# Patient Record
Sex: FEMALE | Race: WHITE
[De-identification: names, ages, dates, MRNs, and addresses within clinical notes are randomized per-mention and may not be internally consistent; named-entity substitution may affect disease eponyms.]

---

## 2019-04-16 ENCOUNTER — HOSPITAL ENCOUNTER (EMERGENCY)
Dept: HOSPITAL 97 - ER | Age: 39
Discharge: HOME | End: 2019-04-16
Payer: SELF-PAY

## 2019-04-16 DIAGNOSIS — M54.9: Primary | ICD-10-CM
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
## 2019-04-16 DIAGNOSIS — I10: ICD-10-CM

## 2019-04-16 DIAGNOSIS — M54.2: ICD-10-CM

## 2019-04-16 PROCEDURE — 81025 URINE PREGNANCY TEST: CPT

## 2019-04-16 PROCEDURE — 74177 CT ABD & PELVIS W/CONTRAST: CPT

## 2019-04-16 PROCEDURE — 71260 CT THORAX DX C+: CPT

## 2019-04-16 PROCEDURE — 81003 URINALYSIS AUTO W/O SCOPE: CPT

## 2019-04-16 PROCEDURE — 70450 CT HEAD/BRAIN W/O DYE: CPT

## 2019-04-16 PROCEDURE — 72125 CT NECK SPINE W/O DYE: CPT

## 2019-04-16 PROCEDURE — 36415 COLL VENOUS BLD VENIPUNCTURE: CPT

## 2019-04-16 PROCEDURE — 99285 EMERGENCY DEPT VISIT HI MDM: CPT
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
## 2022-12-05 ENCOUNTER — HOSPITAL ENCOUNTER (EMERGENCY)
Dept: HOSPITAL 97 - ER | Age: 42
Discharge: HOME | End: 2022-12-05
Payer: COMMERCIAL

## 2022-12-05 VITALS — OXYGEN SATURATION: 97 % | SYSTOLIC BLOOD PRESSURE: 134 MMHG | DIASTOLIC BLOOD PRESSURE: 88 MMHG

## 2022-12-05 VITALS — TEMPERATURE: 99.2 F

## 2022-12-05 DIAGNOSIS — I10: ICD-10-CM

## 2022-12-05 DIAGNOSIS — Z20.822: ICD-10-CM

## 2022-12-05 DIAGNOSIS — J06.9: Primary | ICD-10-CM

## 2022-12-05 LAB — SARS-COV-2 RNA RESP QL NAA+PROBE: NEGATIVE

## 2022-12-05 PROCEDURE — 0240U: CPT

## 2022-12-05 PROCEDURE — 99284 EMERGENCY DEPT VISIT MOD MDM: CPT

## 2022-12-05 PROCEDURE — 71045 X-RAY EXAM CHEST 1 VIEW: CPT

## 2022-12-05 PROCEDURE — 96372 THER/PROPH/DIAG INJ SC/IM: CPT

## 2022-12-05 NOTE — EDPHYS
Physician Documentation                                                                           

 [facility]                                                                 

Name: Susan Euceda                                                                             

Age: 42 yrs                                                                                       

Sex: Female                                                                                       

: 1980                                                                                   

MRN: [id number]                                                                                   

Arrival Date: 2022                                                                          

Time: 13:31                                                                                       

Account#: [id number]                                                                            

Bed 12                                                                                            

Private MD:                                                                                       

ED Physician Erasto Billy                                                                       

HPI:                                                                                              

                                                                                             

15:53 This 42 yrs old  Female presents to ER via Ambulatory with complaints of Cough, kb  

      Fever, Congestion, Chest Pain, Back Pain.                                                   

15:53 The patient or guardian reports cough, that is intermittent, described as moderate, flu kb  

      symptoms, low-grade fever, myalgias. Onset: The symptoms/episode began/occurred 5           

      day(s) ago. Severity of symptoms: At their worst the symptoms were moderate, in the         

      emergency department the symptoms are unchanged. Modifying factors: The symptoms are        

      alleviated by nothing, the symptoms are aggravated by nothing. Associated signs and         

      symptoms: Pertinent positives: fever, rhinorrhea, Pertinent negatives: chest pain,          

      diarrhea, ear ache, nausea, vomiting. The patient has not experienced similar symptoms      

      in the past. The patient has not recently seen a physician. Pt reports cough,               

      congestion, fever since Thursday.                                                           

                                                                                                  

OB/GYN:                                                                                           

14:02 LMP 11/15/2022                                                                          jl7 

                                                                                                  

Historical:                                                                                       

- Allergies:                                                                                      

14:02 No Known Allergies;                                                                     jl7 

- Home Meds:                                                                                      

14:02 lisinopril 12.5 mg Oral tab 1 tab twice a day [Active];                                 jl7 

- PMHx:                                                                                           

14:02 Hypertension;                                                                           jl7 

- PSHx:                                                                                           

14:02 Ligation of fallopian tube;                                                             jl7 

                                                                                                  

- Immunization history:: Client reports receiving the 2nd dose of the Covid vaccine.              

- Social history:: Smoking status: Patient denies any tobacco usage or history of.                

                                                                                                  

                                                                                                  

ROS:                                                                                              

15:51 Cardiovascular: Negative for chest pain, palpitations, and edema.                       kb  

15:51 Constitutional: Positive for body aches, chills, fatigue, fever, malaise.                   

15:51 ENT: Positive for rhinorrhea, sinus congestion.                                             

15:51 Respiratory: Positive for cough.                                                            

15:51 All other systems are negative.                                                             

                                                                                                  

Exam:                                                                                             

15:51 Constitutional:  This is a well developed, well nourished patient who is awake, alert,  kb  

      and in no acute distress. Head/Face:  Normocephalic, atraumatic. ENT:  Moist Mucous         

      membranes Cardiovascular:  Regular rate and rhythm with a normal S1 and S2.  No             

      gallops, murmurs, or rubs.  No pulse deficits. Respiratory:  Respirations even and          

      unlabored. No increased work of breathing. Talking in full sentences Abdomen/GI:  Soft,     

      non-tender. No distention Skin:  Warm, dry with normal turgor.  Normal color. MS/           

      Extremity:  Pulses equal, no cyanosis.  Neurovascular intact.  Full, normal range of        

      motion. Neuro:  Awake and alert, GCS 15, oriented to person, place, time, and               

      situation. Moves all extremities. Normal gait. Psych:  Awake, alert, with orientation       

      to person, place and time.  Behavior, mood, and affect are within normal limits.            

                                                                                                  

Vital Signs:                                                                                      

14:01  / 94; Pulse 105; Resp 17; Temp 99.2; Pulse Ox 96% ; Weight 93.89 kg; Height 5    jl7 

      ft. 1 in. (154.94 cm); Pain 8/10;                                                           

16:02  / 88; Pulse 101; Pulse Ox 97% on R/A;                                            ko1 

14:01 Body Mass Index 39.11 (93.89 kg, 154.94 cm)                                             jl7 

                                                                                                  

MDM:                                                                                              

15:02 Patient medically screened.                                                             kb  

15:51 Data reviewed: vital signs, nurses notes. Data interpreted: Pulse oximetry: on room air kb  

      is 96 %. Interpretation: normal. Counseling: I had a detailed discussion with the           

      patient and/or guardian regarding: the historical points, exam findings, and any            

      diagnostic results supporting the discharge/admit diagnosis, lab results, radiology         

      results, the need for outpatient follow up, a family practitioner, to return to the         

      emergency department if symptoms worsen or persist or if there are any questions or         

      concerns that arise at home.                                                                

                                                                                                  

                                                                                             

14:01 Order name: COVID-19/FLU A+B; Complete Time: 15:00                                      jl7 

                                                                                             

14:01 Order name: XRAY Chest (1 view); Complete Time: 15:51                                   jl7 

                                                                                                  

Administered Medications:                                                                         

15:56 Drug: SOLU-Medrol (methylPREDNISolone sodium succinate) 125 mg Route: IM; Site: right   ko1 

      gluteus;                                                                                    

16:23 Follow up: Response: No adverse reaction                                                ko1 

                                                                                                  

                                                                                                  

Disposition:                                                                                      

17:14 Co-signature as Attending Physician, Erasto Billy MD I agree with the assessment and   kdr 

      plan of care.                                                                               

                                                                                                  

Disposition Summary:                                                                              

22 15:54                                                                                    

Discharge Ordered                                                                                 

      Location: Home                                                                          kb  

      Condition: Stable                                                                       kb  

      Diagnosis                                                                                   

        - Acute upper respiratory infection, unspecified                                      kb  

      Followup:                                                                               kb  

        - With: Emergency Department                                                               

        - When: As needed                                                                          

        - Reason: Worsening of condition                                                           

      Followup:                                                                               kb  

        - With: Private Physician                                                                  

        - When: 2 - 3 days                                                                         

        - Reason: Recheck today's complaints, Continuance of care, Re-evaluation by your           

      physician                                                                                   

      Discharge Instructions:                                                                     

        - Discharge Summary Sheet                                                             kb  

        - Upper Respiratory Infection, Adult, Easy-to-Read                                    kb  

        - Viral Respiratory Infection, Easy-To-Read                                           kb  

      Forms:                                                                                      

        - Medication Reconciliation Form                                                      kb  

        - Thank You Letter                                                                    kb  

        - Antibiotic Education                                                                kb  

        - Prescription Opioid Use                                                             kb  

      Prescriptions:                                                                              

        - Prednisone 20 mg Oral Tablet                                                             

            - take 1 tablet by ORAL route once daily for 5 days; 5 tablet; Refills: 0,        kb  

      Product Selection Permitted                                                                 

        - Tessalon Perles 100 mg Oral Capsule                                                      

            - take 1 capsule by ORAL route every 8 hours As needed; 15 capsule; Refills: 0,   kb  

      Product Selection Permitted                                                                 

Signatures:                                                                                       

Dispatcher [employer]                           EDMS                                                 

Neha Arauz, FNP-C                 FNP-Ckb                                                   

Erasto Billy MD MD kdr Leal, Jahala RN                        RN   jl7                                                  

Barbara Gifford, ADEEL                       RN   ko1                                                  

                                                                                                  

Corrections: (The following items were deleted from the chart)                                    

15:54 15:54 Acute sinusitis, unspecified kb                                                   kb  

                                                                                                  

**************************************************************************************************

## 2022-12-05 NOTE — ER
Nurse's Notes                                                                                     

 [facility]                                                                 

Name: Susan Euceda                                                                             

Age: 42 yrs                                                                                       

Sex: Female                                                                                       

: 1980                                                                                   

MRN: [id number]                                                                                   

Arrival Date: 2022                                                                          

Time: 13:31                                                                                       

Account#: [id number]                                                                            

Bed 12                                                                                            

Private MD:                                                                                       

Diagnosis: Acute upper respiratory infection, unspecified                                         

                                                                                                  

Presentation:                                                                                     

                                                                                             

14:00 Chief complaint: Chief complaint: Patient states: cough, congestion, chills since       jl7 

      Thursday.                                                                                   

14:01 Coronavirus screen: cough unrelated to allergies, Client presents with at least one     [facility] 

      sign or symptom that may indicate coronavirus-19. Ebola Screen: No symptoms or risks        

      identified at this time. Initial Sepsis Screen: Does the patient meet any 2 criteria?       

      No. Patient's initial sepsis screen is negative. Does the patient have a suspected          

      source of infection? No. Patient's initial sepsis screen is negative. Risk Assessment:      

      Do you want to hurt yourself or someone else? Patient reports no desire to harm self or     

      others. Onset of symptoms was 2022.                                             

14:01 Method Of Arrival: Ambulatory                                                           [facility] 

14:01 Acuity: MISTY 4                                                                           jl7 

                                                                                                  

Triage Assessment:                                                                                

14:02 General: Appears in no apparent distress. uncomfortable, Behavior is calm, cooperative, jl7 

      appropriate for age. Pain: Complains of pain in from coughing. Respiratory: no audible      

      wheezes.                                                                                    

                                                                                                  

OB/GYN:                                                                                           

14:02 LMP 11/15/2022                                                                          jl7 

                                                                                                  

Historical:                                                                                       

- Allergies:                                                                                      

14:02 No Known Allergies;                                                                     jl7 

- Home Meds:                                                                                      

14:02 lisinopril 12.5 mg Oral tab 1 tab twice a day [Active];                                 jl7 

- PMHx:                                                                                           

14:02 Hypertension;                                                                           jl7 

- PSHx:                                                                                           

14:02 Ligation of fallopian tube;                                                             jl7 

                                                                                                  

- Immunization history:: Client reports receiving the 2nd dose of the Covid vaccine.              

- Social history:: Smoking status: Patient denies any tobacco usage or history of.                

                                                                                                  

                                                                                                  

Screenin:02 Abuse screen: Denies threats or abuse. Denies injuries from another. Nutritional        ko1 

      screening: No deficits noted. Tuberculosis screening: No symptoms or risk factors           

      identified. Fall Risk None identified.                                                      

                                                                                                  

Assessment:                                                                                       

16:02 General: Appears in no apparent distress. comfortable, Behavior is calm, cooperative,   ko1 

      appropriate for age. Pain: Denies pain. Neuro: No deficits noted. Cardiovascular:           

      Patient's skin is warm and dry. Respiratory: Airway is patent Trachea midline               

      Respiratory effort is even, unlabored, Respiratory pattern is regular, symmetrical,         

      Sputum is. GI: No deficits noted. : No deficits noted. EENT: No deficits noted. Derm:     

      No deficits noted. Musculoskeletal: No deficits noted.                                      

                                                                                                  

Vital Signs:                                                                                      

14:01  / 94; Pulse 105; Resp 17; Temp 99.2; Pulse Ox 96% ; Weight 93.89 kg; Height 5    jl7 

      ft. 1 in. (154.94 cm); Pain 8/10;                                                           

16:02  / 88; Pulse 101; Pulse Ox 97% on R/A;                                            ko1 

14:01 Body Mass Index 39.11 (93.89 kg, 154.94 cm)                                             jl7 

                                                                                                  

ED Course:                                                                                        

13:31 Patient arrived in ED.                                                                  as  

13:33 [facility]                                                        kb  

13:33 Erasto Billy MD is Attending Physician.                                              kb  

14:02 Triage completed.                                                                       jl7 

14:02 Arm band placed on right wrist.                                                         jl7 

14:04 COVID swab sent to lab. Flu and/or RSV swab sent to lab.                                jl7 

14:36 XRAY Chest (1 view) In Process Unspecified.                                             EDMS

15:51 Barbara Gifford, RN is Primary Nurse.                                                     ko1 

16:02 Patient has correct armband on for positive identification. Bed in low position. Call   ko1 

      light in reach. Side rails up X 1. NIBP on.                                                 

16:02 No provider procedures requiring assistance completed. Patient did not have IV access   ko1 

      during this emergency room visit.                                                           

                                                                                                  

Administered Medications:                                                                         

15:56 Drug: SOLU-Medrol (methylPREDNISolone sodium succinate) 125 mg Route: IM; Site: right   ko1 

      gluteus;                                                                                    

16:23 Follow up: Response: No adverse reaction                                                ko1 

                                                                                                  

                                                                                                  

Medication:                                                                                       

16:02 VIS not applicable for this client.                                                     ko1 

                                                                                                  

Outcome:                                                                                          

15:54 Discharge ordered by MD.                                                                kb  

16:23 Discharged to home ambulatory.                                                          ko1 

16:23 Condition: good                                                                             

16:23 Discharge instructions given to patient, Instructed on discharge instructions, follow       

      up and referral plans. medication usage, Demonstrated understanding of instructions,        

      follow-up care, medications, Prescriptions given X 2.                                       

16:23 Patient left the ED.                                                                    ko1 

                                                                                                  

Signatures:                                                                                       

Dispatcher [employer]                           EDMS                                                 

Davonte, Neha, FNP-C                 FNP-[place]                             as                                                   

Laura Marquez RN                        RN   jl7                                                  

Barbara Gifford RN                       RN   ko1                                                  

                                                                                                  

Corrections: (The following items were deleted from the chart)                                    

14:02 14:00 Chief complaint: junie zaman 

                                                                                                  

**************************************************************************************************

## 2022-12-05 NOTE — RAD REPORT
EXAM DESCRIPTION:  Yanet Single View12/5/2022 2:34 pm

 

CLINICAL HISTORY:  Congestion

 

COMPARISON:  2015

 

FINDINGS:   The lungs appear clear of acute infiltrate. The heart is normal size

 

IMPRESSION:   No acute abnormalities displayed